# Patient Record
Sex: FEMALE | Race: BLACK OR AFRICAN AMERICAN | NOT HISPANIC OR LATINO | Employment: FULL TIME | URBAN - METROPOLITAN AREA
[De-identification: names, ages, dates, MRNs, and addresses within clinical notes are randomized per-mention and may not be internally consistent; named-entity substitution may affect disease eponyms.]

---

## 2018-05-18 ENCOUNTER — TRANSCRIBE ORDERS (OUTPATIENT)
Dept: ADMINISTRATIVE | Facility: HOSPITAL | Age: 48
End: 2018-05-18

## 2018-05-18 ENCOUNTER — HOSPITAL ENCOUNTER (OUTPATIENT)
Dept: ULTRASOUND IMAGING | Facility: HOSPITAL | Age: 48
Discharge: HOME | End: 2018-05-18
Admitting: INTERNAL MEDICINE

## 2018-05-18 DIAGNOSIS — E01.0 THYROMEGALY: ICD-10-CM

## 2018-05-18 DIAGNOSIS — E01.0 THYROMEGALY: Primary | ICD-10-CM

## 2018-05-18 PROCEDURE — 76536 US EXAM OF HEAD AND NECK: CPT

## 2018-06-14 ENCOUNTER — OFFICE VISIT (OUTPATIENT)
Dept: OBSTETRICS AND GYNECOLOGY | Facility: CLINIC | Age: 48
End: 2018-06-14

## 2018-06-14 VITALS
DIASTOLIC BLOOD PRESSURE: 72 MMHG | WEIGHT: 220.6 LBS | SYSTOLIC BLOOD PRESSURE: 132 MMHG | HEIGHT: 67 IN | BODY MASS INDEX: 34.62 KG/M2

## 2018-06-14 DIAGNOSIS — Z12.31 VISIT FOR SCREENING MAMMOGRAM: ICD-10-CM

## 2018-06-14 DIAGNOSIS — Z01.419 WELL WOMAN EXAM WITH ROUTINE GYNECOLOGICAL EXAM: Primary | ICD-10-CM

## 2018-06-14 PROCEDURE — 99386 PREV VISIT NEW AGE 40-64: CPT | Performed by: OBSTETRICS & GYNECOLOGY

## 2018-06-14 NOTE — PROGRESS NOTES
Subjective   Franchesca Elizondo is a 48 y.o. female is here today as a self referral for annual.    History of Present Illness-here today for annual exam and checkup.  Hysterectomy was performed for abnormal bleeding.  No problems noted.    The following portions of the patient's history were reviewed and updated as appropriate: allergies, current medications, past family history, past medical history, past social history, past surgical history and problem list.    Review of Systems   Constitutional: Negative.    HENT: Negative.    Eyes: Negative.    Respiratory: Negative.    Cardiovascular: Negative.    Gastrointestinal: Negative.    Endocrine: Negative.    Genitourinary: Negative.    Musculoskeletal: Negative.    Skin: Negative.    Allergic/Immunologic: Negative.    Neurological: Negative.    Hematological: Negative.    Psychiatric/Behavioral: Negative.        Objective   Physical Exam   Constitutional: She is oriented to person, place, and time. She appears well-developed and well-nourished.   HENT:   Head: Normocephalic and atraumatic.   Nose: Nose normal.   Eyes: Conjunctivae and EOM are normal. Pupils are equal, round, and reactive to light.   Neck: Normal range of motion. Neck supple.   Cardiovascular: Normal rate, regular rhythm and normal heart sounds.    Pulmonary/Chest: Effort normal and breath sounds normal. Right breast exhibits no inverted nipple, no mass, no nipple discharge, no skin change and no tenderness. Left breast exhibits no inverted nipple, no mass, no nipple discharge and no skin change. Breasts are symmetrical. There is no breast swelling.   Abdominal: Soft. Hernia confirmed negative in the right inguinal area and confirmed negative in the left inguinal area.   Genitourinary: Rectum normal. No breast tenderness, discharge or bleeding. Pelvic exam was performed with patient supine. No labial fusion. There is no rash, tenderness, lesion or injury on the right labia. There is no rash,  tenderness, lesion or injury on the left labia. Right adnexum displays no mass, no tenderness and no fullness. Left adnexum displays no mass, no tenderness and no fullness. No erythema or bleeding in the vagina. No foreign body in the vagina. No signs of injury around the vagina. No vaginal discharge found.   Neurological: She is alert and oriented to person, place, and time. She has normal reflexes.   Skin: Skin is warm and dry.   Psychiatric: She has a normal mood and affect. Her behavior is normal. Judgment and thought content normal.         Assessment/Plan   Problems Addressed this Visit     None      Visit Diagnoses     Well woman exam with routine gynecological exam    -  Primary        Mammogram encouraged.

## 2018-06-18 ENCOUNTER — TRANSCRIBE ORDERS (OUTPATIENT)
Dept: ADMINISTRATIVE | Facility: HOSPITAL | Age: 48
End: 2018-06-18

## 2018-06-18 DIAGNOSIS — Z12.31 VISIT FOR SCREENING MAMMOGRAM: Primary | ICD-10-CM

## 2018-06-29 ENCOUNTER — HOSPITAL ENCOUNTER (OUTPATIENT)
Dept: MAMMOGRAPHY | Facility: HOSPITAL | Age: 48
Discharge: HOME | End: 2018-06-29
Attending: OBSTETRICS & GYNECOLOGY | Admitting: OBSTETRICS & GYNECOLOGY

## 2018-06-29 DIAGNOSIS — Z12.31 VISIT FOR SCREENING MAMMOGRAM: ICD-10-CM

## 2018-06-29 PROCEDURE — 77063 BREAST TOMOSYNTHESIS BI: CPT

## 2018-06-29 PROCEDURE — 77067 SCR MAMMO BI INCL CAD: CPT

## 2018-07-05 DIAGNOSIS — N64.89 BREAST ASYMMETRY: Primary | ICD-10-CM

## 2018-07-20 ENCOUNTER — HOSPITAL ENCOUNTER (OUTPATIENT)
Dept: MAMMOGRAPHY | Facility: HOSPITAL | Age: 48
Discharge: HOME | End: 2018-07-20
Attending: OBSTETRICS & GYNECOLOGY | Admitting: OBSTETRICS & GYNECOLOGY

## 2018-07-20 ENCOUNTER — HOSPITAL ENCOUNTER (OUTPATIENT)
Dept: ULTRASOUND IMAGING | Facility: HOSPITAL | Age: 48
Discharge: HOME | End: 2018-07-20
Attending: OBSTETRICS & GYNECOLOGY

## 2018-07-20 DIAGNOSIS — N64.89 BREAST ASYMMETRY: ICD-10-CM

## 2018-07-20 PROCEDURE — 76642 ULTRASOUND BREAST LIMITED: CPT

## 2018-07-20 PROCEDURE — 77065 DX MAMMO INCL CAD UNI: CPT

## 2020-07-14 ENCOUNTER — OFFICE VISIT (OUTPATIENT)
Dept: FAMILY MEDICINE CLINIC | Facility: CLINIC | Age: 50
End: 2020-07-14

## 2020-07-14 VITALS
HEART RATE: 83 BPM | RESPIRATION RATE: 16 BRPM | BODY MASS INDEX: 36.22 KG/M2 | DIASTOLIC BLOOD PRESSURE: 84 MMHG | WEIGHT: 230.8 LBS | SYSTOLIC BLOOD PRESSURE: 122 MMHG | OXYGEN SATURATION: 99 % | HEIGHT: 67 IN | TEMPERATURE: 97.8 F

## 2020-07-14 DIAGNOSIS — E78.00 PURE HYPERCHOLESTEROLEMIA: Primary | ICD-10-CM

## 2020-07-14 PROCEDURE — 99213 OFFICE O/P EST LOW 20 MIN: CPT | Performed by: INTERNAL MEDICINE

## 2020-07-14 RX ORDER — EPINEPHRINE 0.3 MG/.3ML
0.3 INJECTION SUBCUTANEOUS
COMMUNITY
Start: 2015-08-14

## 2020-07-14 RX ORDER — MELATONIN
1000 DAILY
COMMUNITY

## 2020-07-15 DIAGNOSIS — E78.00 PURE HYPERCHOLESTEROLEMIA: ICD-10-CM

## 2020-07-16 ENCOUNTER — TELEPHONE (OUTPATIENT)
Dept: FAMILY MEDICINE CLINIC | Facility: CLINIC | Age: 50
End: 2020-07-16

## 2020-07-16 LAB
CHOLEST SERPL-MCNC: 224 MG/DL (ref 0–200)
HCV AB S/CO SERPL IA: <0.1 S/CO RATIO (ref 0–0.9)
HDLC SERPL-MCNC: 60 MG/DL (ref 40–60)
LDLC SERPL CALC-MCNC: 147 MG/DL (ref 0–100)
TRIGL SERPL-MCNC: 86 MG/DL (ref 0–150)
VLDLC SERPL CALC-MCNC: 17.2 MG/DL

## 2020-07-16 NOTE — TELEPHONE ENCOUNTER
Patient's LDL is elevated at 147.  Determine with her next visits currently scheduled for 8/4/2020 that the patient was truly fasting    Patient is currently on simvastatin 5 mg half tab by mouth every at bedtime terminal Y such a low dose.

## 2020-07-30 ENCOUNTER — TELEPHONE (OUTPATIENT)
Dept: FAMILY MEDICINE CLINIC | Facility: CLINIC | Age: 50
End: 2020-07-30

## 2020-08-11 ENCOUNTER — OFFICE VISIT (OUTPATIENT)
Dept: FAMILY MEDICINE CLINIC | Facility: CLINIC | Age: 50
End: 2020-08-11

## 2020-08-11 VITALS
SYSTOLIC BLOOD PRESSURE: 130 MMHG | HEART RATE: 91 BPM | TEMPERATURE: 99.1 F | BODY MASS INDEX: 36.73 KG/M2 | RESPIRATION RATE: 16 BRPM | DIASTOLIC BLOOD PRESSURE: 98 MMHG | WEIGHT: 234 LBS | HEIGHT: 67 IN | OXYGEN SATURATION: 99 %

## 2020-08-11 DIAGNOSIS — E01.0 THYROMEGALY: ICD-10-CM

## 2020-08-11 DIAGNOSIS — Z00.00 ENCOUNTER FOR PHYSICAL EXAMINATION: Primary | ICD-10-CM

## 2020-08-11 DIAGNOSIS — E78.41 ELEVATED LIPOPROTEIN(A): ICD-10-CM

## 2020-08-11 PROCEDURE — 99396 PREV VISIT EST AGE 40-64: CPT | Performed by: INTERNAL MEDICINE

## 2020-08-13 NOTE — PROGRESS NOTES
2020    CC: Annual Exam  .        HPI  Obesity   This is a recurrent problem. The current episode started more than 1 month ago. The problem occurs 2 to 4 times per day. The problem has been gradually worsening. The symptoms are aggravated by walking, swallowing, stress and standing. She has tried heat and ice for the symptoms. The treatment provided mild relief.        Marquis Elizondo is a 50 y.o. female.      The following portions of the patient's history were reviewed and updated as appropriate: allergies, current medications, past family history, past medical history, past social history, past surgical history and problem list.    Problem List  There is no problem list on file for this patient.      Past Medical History  Past Medical History:   Diagnosis Date   • Abnormal weight gain 2019   • Dizziness and giddiness    • Hyperlipidemia 2018   • Hypertension 2018   • Menorrhagia    • Vitamin D deficiency disease 2014       Surgical History  Past Surgical History:   Procedure Laterality Date   • BLADDER REPAIR     •  SECTION     • HYSTERECTOMY     • TUBAL ABDOMINAL LIGATION         Family History  History reviewed. No pertinent family history.    Social History  Social History    Tobacco Use      Smoking status: Never Smoker      Smokeless tobacco: Never Used       Is the Patient a current tobacco user? No    Allergies  Allergies   Allergen Reactions   • Sulfa Antibiotics Itching     Sulfa based eye medicine irritated eyes severely.       Current Medications    Current Outpatient Medications:   •  cholecalciferol (VITAMIN D3) 25 MCG (1000 UT) tablet, Take 1,000 Units by mouth Daily., Disp: , Rfl:   •  EPINEPHrine (EPIPEN) 0.3 MG/0.3ML solution auto-injector injection, Inject 0.3 mg into the appropriate muscle as directed by prescriber., Disp: , Rfl:      Review of System  Review of Systems   Constitutional: Negative.    HENT: Negative.    Eyes:  Negative.    Respiratory: Negative.    Cardiovascular: Negative.    Gastrointestinal: Negative.    Musculoskeletal: Negative.    Skin: Negative.    Psychiatric/Behavioral: Negative.      I have reviewed and confirmed the accuracy of the ROS as documented by the MA/LPN/RN Rivera Churchill MD    Vitals:    08/11/20 1613   BP: 130/98   Pulse: 91   Resp: 16   Temp: 99.1 °F (37.3 °C)   SpO2: 99%     Body mass index is 36.65 kg/m².    Objective     Orders Only on 07/15/2020   Component Date Value Ref Range Status   • Total Cholesterol 07/15/2020 224* 0 - 200 mg/dL Final   • Triglycerides 07/15/2020 86  0 - 150 mg/dL Final   • HDL Cholesterol 07/15/2020 60  40 - 60 mg/dL Final   • VLDL Cholesterol 07/15/2020 17.2  mg/dL Final   • LDL Cholesterol  07/15/2020 147* 0 - 100 mg/dL Final   • Hep C Virus Ab 07/15/2020 <0.1  0.0 - 0.9 s/co ratio Final    Comment:                                   Negative:     < 0.8                               Indeterminate: 0.8 - 0.9                                    Positive:     > 0.9   The CDC recommends that a positive HCV antibody result   be followed up with a HCV Nucleic Acid Amplification   test ([id number]).         Physical Exam  Physical Exam   Constitutional: She is oriented to person, place, and time. She appears well-developed and well-nourished.   Eyes: Conjunctivae and EOM are normal.   Neck: Normal range of motion. Neck supple. Thyromegaly present.   Cardiovascular: Normal rate, regular rhythm and normal heart sounds.   Pulmonary/Chest: Effort normal and breath sounds normal.   Abdominal: Soft. Bowel sounds are normal.   Musculoskeletal: Normal range of motion.   Neurological: She is alert and oriented to person, place, and time.   Skin: Skin is warm and dry.   Psychiatric: She has a normal mood and affect. Her behavior is normal.   Nursing note and vitals reviewed.      Assessment/Plan   Franchesca was seen today for annual exam.    Diagnoses and all orders for this  visit:    Encounter for physical examination    Thyromegaly  -     US thyroid; Future  -     T4, free; Future  -     TSH; Future    Elevated lipoprotein(a)             Rivera Churchill MD  08/11/2020

## 2020-08-15 LAB
T4 FREE SERPL-MCNC: 1.22 NG/DL (ref 0.93–1.7)
TSH SERPL DL<=0.005 MIU/L-ACNC: 2.15 UIU/ML (ref 0.27–4.2)

## 2020-08-16 ENCOUNTER — RESULTS ENCOUNTER (OUTPATIENT)
Dept: FAMILY MEDICINE CLINIC | Facility: CLINIC | Age: 50
End: 2020-08-16

## 2020-08-16 DIAGNOSIS — E01.0 THYROMEGALY: ICD-10-CM

## 2020-08-17 ENCOUNTER — HOSPITAL ENCOUNTER (OUTPATIENT)
Dept: ULTRASOUND IMAGING | Facility: HOSPITAL | Age: 50
Discharge: HOME | End: 2020-08-17
Admitting: INTERNAL MEDICINE

## 2020-08-17 DIAGNOSIS — E01.0 THYROMEGALY: ICD-10-CM

## 2020-08-17 PROCEDURE — 76536 US EXAM OF HEAD AND NECK: CPT

## 2020-10-05 DIAGNOSIS — E78.41 ELEVATED LIPOPROTEIN(A): Primary | ICD-10-CM

## 2020-10-06 LAB
CHOLEST SERPL-MCNC: 234 MG/DL (ref 0–200)
CHOLEST/HDLC SERPL: 3.66 {RATIO}
HDLC SERPL-MCNC: 64 MG/DL (ref 40–60)
LDLC SERPL CALC-MCNC: 148 MG/DL (ref 0–100)
TRIGL SERPL-MCNC: 109 MG/DL (ref 0–150)
VLDLC SERPL CALC-MCNC: 21.8 MG/DL

## 2020-10-09 ENCOUNTER — TELEPHONE (OUTPATIENT)
Dept: FAMILY MEDICINE CLINIC | Facility: CLINIC | Age: 50
End: 2020-10-09

## 2020-10-09 ENCOUNTER — OFFICE VISIT (OUTPATIENT)
Dept: FAMILY MEDICINE CLINIC | Facility: CLINIC | Age: 50
End: 2020-10-09

## 2020-10-09 VITALS
HEIGHT: 67 IN | HEART RATE: 88 BPM | WEIGHT: 230.6 LBS | DIASTOLIC BLOOD PRESSURE: 88 MMHG | RESPIRATION RATE: 16 BRPM | OXYGEN SATURATION: 99 % | SYSTOLIC BLOOD PRESSURE: 130 MMHG | BODY MASS INDEX: 36.19 KG/M2 | TEMPERATURE: 96.6 F

## 2020-10-09 DIAGNOSIS — E78.49 OTHER HYPERLIPIDEMIA: ICD-10-CM

## 2020-10-09 DIAGNOSIS — I10 ESSENTIAL HYPERTENSION: Primary | ICD-10-CM

## 2020-10-09 PROCEDURE — 99214 OFFICE O/P EST MOD 30 MIN: CPT | Performed by: INTERNAL MEDICINE

## 2020-10-09 RX ORDER — ROSUVASTATIN CALCIUM 10 MG/1
TABLET, COATED ORAL
Qty: 15 TABLET | Refills: 0 | Status: SHIPPED | OUTPATIENT
Start: 2020-10-09 | End: 2020-10-28 | Stop reason: SDUPTHER

## 2020-10-09 NOTE — TELEPHONE ENCOUNTER
PATIENT WAS SEEN TODAY BY DR. CASTORENA      PATIENT WAS TOLD BY DR. YING THAT SHE CAN NOT TAKE THE GENERIC OF THE CHOLESTEROL MEDICATION PRESCRIBE TO HER TODAY    PATIENT IS NEEDING DR. CASTORENA TO CALL THE INSURANCE COMPANY AND EXPLAIN WHY SHE NEEDS TO TAKE THE BRAND NAME MEDICATION INSTEAD OF THE GENETIC        PT [phone]

## 2020-10-28 RX ORDER — ROSUVASTATIN CALCIUM 10 MG/1
TABLET, COATED ORAL
Qty: 15 TABLET | Refills: 4 | Status: SHIPPED | OUTPATIENT
Start: 2020-10-28 | End: 2020-12-09

## 2020-10-28 NOTE — PROGRESS NOTES
10/09/2020    CC: Follow-up (on labs and thyroid scan)  .        HPI  Hyperlipidemia  This is a chronic problem. The current episode started more than 1 year ago. The problem is uncontrolled. Recent lipid tests were reviewed and are high. There are no known factors aggravating her hyperlipidemia. The current treatment provides no improvement of lipids. Compliance problems include adherence to diet.  Risk factors for coronary artery disease include hypertension and dyslipidemia.        Marquis Elizondo is a 50 y.o. female.      The following portions of the patient's history were reviewed and updated as appropriate: allergies, current medications, past family history, past medical history, past social history, past surgical history and problem list.    Problem List  There is no problem list on file for this patient.      Past Medical History  Past Medical History:   Diagnosis Date   • Abnormal weight gain 2019   • Dizziness and giddiness    • Hyperlipidemia 2018   • Hypertension 2018   • Menorrhagia    • Vitamin D deficiency disease 2014       Surgical History  Past Surgical History:   Procedure Laterality Date   • BLADDER REPAIR     •  SECTION     • HYSTERECTOMY     • TUBAL ABDOMINAL LIGATION         Family History  History reviewed. No pertinent family history.    Social History  Social History    Tobacco Use      Smoking status: Never Smoker      Smokeless tobacco: Never Used       Is the Patient a current tobacco user? No    Allergies  Allergies   Allergen Reactions   • Sulfa Antibiotics Itching     Sulfa based eye medicine irritated eyes severely.       Current Medications    Current Outpatient Medications:   •  cholecalciferol (VITAMIN D3) 25 MCG (1000 UT) tablet, Take 1,000 Units by mouth Daily., Disp: , Rfl:   •  EPINEPHrine (EPIPEN) 0.3 MG/0.3ML solution auto-injector injection, Inject 0.3 mg into the appropriate muscle as directed by prescriber., Disp: ,  Rfl:   •  rosuvastatin (Crestor) 10 MG tablet, 1 q every other day, Disp: 15 tablet, Rfl: 4     Review of System  Review of Systems   Constitutional: Negative.    Eyes: Negative.    Gastrointestinal: Negative.    Neurological: Negative.    Hematological: Negative.      I have reviewed and confirmed the accuracy of the ROS as documented by the MA/LPN/RN Rivera Churchill MD    Vitals:    10/09/20 0833   BP: 130/88   Pulse: 88   Resp: 16   Temp: 96.6 °F (35.9 °C)   SpO2: 99%     Body mass index is 36.12 kg/m².    Objective     Orders Only on 10/05/2020   Component Date Value Ref Range Status   • Total Cholesterol 10/05/2020 234* 0 - 200 mg/dL Final   • Triglycerides 10/05/2020 109  0 - 150 mg/dL Final   • HDL Cholesterol 10/05/2020 64* 40 - 60 mg/dL Final   • VLDL Cholesterol Jered 10/05/2020 21.8  mg/dL Final   • LDL Chol Calc ([facility]) 10/05/2020 148* 0 - 100 mg/dL Final   • Chol/HDL Ratio 10/05/2020 3.66   Final       Physical Exam  Physical Exam  Constitutional:       Appearance: Normal appearance.   HENT:      Head: Normocephalic.   Cardiovascular:      Pulses: Normal pulses.      Heart sounds: Normal heart sounds.   Pulmonary:      Effort: Pulmonary effort is normal.      Breath sounds: Normal breath sounds.   Abdominal:      General: Abdomen is flat.      Palpations: Abdomen is soft.   Neurological:      Mental Status: She is alert.         Assessment/Plan    This patient presents today for follow-up of hyperlipidemia.  Is a long history of elevated cholesterol last LDL was 148 a previous level was 147.  She was previously on simvastatin [place] is called myalgias.  We switched her to Crestor 10 mg every OD with plans to increase it up to daily after about a month if no improvement in her cholesterol.  Patient also had a prior history of thyroid nodule and thyroid scan are shared with her done on 8/17 showed no thyroid nodules abnormalities.  Her blood pressure was within normal limits 130/80 in the left arm sitting  position standard cuff.    We also discussed the importance of weight loss this discussion was for 15 minutes.  Patient would like to avoid increases in her Crestor but as we indicated to her we have to get these LDL levels down.  We suggested Weight Watchers as a possible helper  for her in losing weight. Her current weight is unchanged from the past 4 months.            Diagnoses and all orders for this visit:    1. Essential hypertension (Primary): Established problem, controlled    2. Other hyperlipidemia: Established problem, poorly controlled    Other orders  -     Discontinue: rosuvastatin (Crestor) 10 MG tablet; 1 q every day  Dispense: 15 tablet; Refill: 0    Plan:  Continue Crestor 10 mg 1 tab by mouth every other day, considering increasing to daily depending on future lipid levels.         Rivera Churchill MD  10/09/2020   English

## 2020-11-17 ENCOUNTER — TELEPHONE (OUTPATIENT)
Dept: FAMILY MEDICINE CLINIC | Facility: CLINIC | Age: 50
End: 2020-11-17

## 2020-11-17 DIAGNOSIS — E78.00 PURE HYPERCHOLESTEROLEMIA: Primary | ICD-10-CM

## 2020-11-22 ENCOUNTER — RESULTS ENCOUNTER (OUTPATIENT)
Dept: FAMILY MEDICINE CLINIC | Facility: CLINIC | Age: 50
End: 2020-11-22

## 2020-11-22 DIAGNOSIS — E78.00 PURE HYPERCHOLESTEROLEMIA: ICD-10-CM

## 2020-12-01 LAB
CHOLEST SERPL-MCNC: 210 MG/DL (ref 0–200)
CHOLEST/HDLC SERPL: 3.18 {RATIO}
HDLC SERPL-MCNC: 66 MG/DL (ref 40–60)
LDLC SERPL CALC-MCNC: 127 MG/DL (ref 0–100)
TRIGL SERPL-MCNC: 98 MG/DL (ref 0–150)
VLDLC SERPL CALC-MCNC: 17 MG/DL (ref 5–40)

## 2020-12-09 ENCOUNTER — OFFICE VISIT (OUTPATIENT)
Dept: FAMILY MEDICINE CLINIC | Facility: CLINIC | Age: 50
End: 2020-12-09

## 2020-12-09 VITALS
HEIGHT: 67 IN | TEMPERATURE: 97.5 F | SYSTOLIC BLOOD PRESSURE: 128 MMHG | DIASTOLIC BLOOD PRESSURE: 86 MMHG | WEIGHT: 213.4 LBS | HEART RATE: 84 BPM | BODY MASS INDEX: 33.49 KG/M2 | OXYGEN SATURATION: 97 %

## 2020-12-09 DIAGNOSIS — E78.49 OTHER HYPERLIPIDEMIA: Primary | ICD-10-CM

## 2020-12-09 DIAGNOSIS — R63.4 RECENT WEIGHT LOSS: ICD-10-CM

## 2020-12-09 PROCEDURE — 99213 OFFICE O/P EST LOW 20 MIN: CPT | Performed by: INTERNAL MEDICINE

## 2020-12-09 NOTE — PROGRESS NOTES
2020    CC: Discuss lab results  .        HPI  Hyperlipidemia  This is a chronic problem. The current episode started more than 1 month ago. The problem is uncontrolled. Recent lipid tests were reviewed and are high. Exacerbating diseases include obesity. Factors aggravating her hyperlipidemia include fatty foods. She is currently on no antihyperlipidemic treatment.        Subjective   Franchesca Elizondo is a 50 y.o. female.      The following portions of the patient's history were reviewed and updated as appropriate: allergies, current medications, past family history, past medical history, past social history, past surgical history and problem list.    Problem List  There is no problem list on file for this patient.      Past Medical History  Past Medical History:   Diagnosis Date   • Abnormal weight gain 2019   • Dizziness and giddiness    • Hyperlipidemia 2018   • Hypertension 2018   • Menorrhagia    • Vitamin D deficiency disease 2014       Surgical History  Past Surgical History:   Procedure Laterality Date   • BLADDER REPAIR     •  SECTION     • HYSTERECTOMY     • TUBAL ABDOMINAL LIGATION         Family History  History reviewed. No pertinent family history.    Social History  Social History    Tobacco Use      Smoking status: Never Smoker      Smokeless tobacco: Never Used       Is the Patient a current tobacco user? No    Allergies  Allergies   Allergen Reactions   • Sulfa Antibiotics Itching     Sulfa based eye medicine irritated eyes severely.       Current Medications    Current Outpatient Medications:   •  cholecalciferol (VITAMIN D3) 25 MCG (1000 UT) tablet, Take 1,000 Units by mouth Daily., Disp: , Rfl:   •  EPINEPHrine (EPIPEN) 0.3 MG/0.3ML solution auto-injector injection, Inject 0.3 mg into the appropriate muscle as directed by prescriber., Disp: , Rfl:   •  rosuvastatin (Crestor) 10 MG tablet, 1 q every other day, Disp: 15 tablet, Rfl: 4     Review of  System  Review of Systems   Constitutional: Negative.    HENT: Negative.    Eyes: Negative.    Respiratory: Negative.      I have reviewed and confirmed the accuracy of the ROS as documented by the MA/LPN/RN Rivera Churchill MD    Vitals:    12/09/20 0821   BP: 128/86   Pulse: 84   Temp: 97.5 °F (36.4 °C)   SpO2: 97%     Body mass index is 33.42 kg/m².    Objective     Orders Only on 11/30/2020   Component Date Value Ref Range Status   • Total Cholesterol 11/30/2020 210* 0 - 200 mg/dL Final   • Triglycerides 11/30/2020 98  0 - 150 mg/dL Final   • HDL Cholesterol 11/30/2020 66* 40 - 60 mg/dL Final   • VLDL Cholesterol Jered 11/30/2020 17  5 - 40 mg/dL Final   • LDL Chol Calc ([facility]) 11/30/2020 127* 0 - 100 mg/dL Final   • Chol/HDL Ratio 11/30/2020 3.18   Final       Physical Exam  Physical Exam  Constitutional:       Appearance: Normal appearance. She is obese.   HENT:      Head: Atraumatic.      Nose: Nose normal.   Eyes:      Extraocular Movements: Extraocular movements intact.   Neck:      Musculoskeletal: Normal range of motion.   Cardiovascular:      Rate and Rhythm: Normal rate and regular rhythm.   Pulmonary:      Effort: Pulmonary effort is normal.   Neurological:      Mental Status: She is alert.         Assessment/Plan        This very pleasant patient presents today for follow-up of hyperlipidemia.  She relates she is watching her diet very carefully.  With her last visit we knew that her LDL was elevated at 147 we recommended she get started on a statin however she previously had trouble with her insurance company paying for Crestor and she had had problems with other statins.  The patient elected to try to reduce her LDL with diet alone.    Her blood pressure was well controlled today at 128/86 in the left arm sitting position.    We note that she has lost 17 pounds from her last visit!.  He has done this by just cutting back on portions and watching her meat intake.  She is also purchased a Fitbit and is  getting in 10,000 steps per day at least 80% of the time.    This is an extremely difficult time of the year to diet and I think that she can make more progress if we recheck her again over in the spring.  She agrees she has several birthdays and holidays to come.  We will see her back in follow-up and reevaluate her cholesterol at that time she will remain off statins and continue with her fat reducing diet.    Patient will return in 1 week before next scheduled appointment for lipid profile.    Diagnoses and all orders for this visit:    1. Other hyperlipidemia (Primary): Established problem, improved  -     Lipid Panel With LDL/HDL Ratio; Future    2. Recent weight loss: New problem, patient doing well.           Rivera Churchill MD  12/09/2020

## 2020-12-14 ENCOUNTER — RESULTS ENCOUNTER (OUTPATIENT)
Dept: FAMILY MEDICINE CLINIC | Facility: CLINIC | Age: 50
End: 2020-12-14

## 2020-12-14 DIAGNOSIS — E78.49 OTHER HYPERLIPIDEMIA: ICD-10-CM

## 2021-01-11 ENCOUNTER — TRANSCRIBE ORDERS (OUTPATIENT)
Dept: ADMINISTRATIVE | Facility: HOSPITAL | Age: 51
End: 2021-01-11

## 2021-01-11 DIAGNOSIS — Z12.31 SCREENING MAMMOGRAM, ENCOUNTER FOR: Primary | ICD-10-CM

## 2021-02-25 ENCOUNTER — HOSPITAL ENCOUNTER (OUTPATIENT)
Dept: MAMMOGRAPHY | Facility: HOSPITAL | Age: 51
Discharge: HOME | End: 2021-02-25
Admitting: INTERNAL MEDICINE

## 2021-02-25 DIAGNOSIS — Z12.31 SCREENING MAMMOGRAM, ENCOUNTER FOR: ICD-10-CM

## 2021-02-25 PROCEDURE — 77063 BREAST TOMOSYNTHESIS BI: CPT

## 2021-02-25 PROCEDURE — 77067 SCR MAMMO BI INCL CAD: CPT

## 2021-03-01 ENCOUNTER — TELEPHONE (OUTPATIENT)
Dept: FAMILY MEDICINE CLINIC | Facility: CLINIC | Age: 51
End: 2021-03-01

## 2021-03-01 NOTE — TELEPHONE ENCOUNTER
Caller: Franchesca Moeller    Relationship to patient: Self    Best call back number: [phone]*    Patient is needing: PATIENT CALLED NEEDING TO SCHEDULE FOR LABS. ATTEMPTED TO WT, NO ANSWER.

## 2021-03-03 LAB
CHOLEST SERPL-MCNC: 195 MG/DL (ref 0–200)
HDLC SERPL-MCNC: 63 MG/DL (ref 40–60)
LDLC SERPL CALC-MCNC: 116 MG/DL (ref 0–100)
LDLC/HDLC SERPL: 1.82 {RATIO}
TRIGL SERPL-MCNC: 87 MG/DL (ref 0–150)
VLDLC SERPL CALC-MCNC: 16 MG/DL (ref 5–40)

## 2021-03-09 ENCOUNTER — OFFICE VISIT (OUTPATIENT)
Dept: FAMILY MEDICINE CLINIC | Facility: CLINIC | Age: 51
End: 2021-03-09

## 2021-03-09 VITALS
HEIGHT: 67 IN | BODY MASS INDEX: 32.24 KG/M2 | RESPIRATION RATE: 16 BRPM | TEMPERATURE: 98 F | SYSTOLIC BLOOD PRESSURE: 130 MMHG | WEIGHT: 205.4 LBS | DIASTOLIC BLOOD PRESSURE: 88 MMHG

## 2021-03-09 DIAGNOSIS — I10 ESSENTIAL HYPERTENSION: Chronic | ICD-10-CM

## 2021-03-09 DIAGNOSIS — E78.00 PURE HYPERCHOLESTEROLEMIA: Primary | ICD-10-CM

## 2021-03-09 PROCEDURE — 99214 OFFICE O/P EST MOD 30 MIN: CPT | Performed by: INTERNAL MEDICINE

## 2021-03-09 NOTE — PROGRESS NOTES
2021    CC: Hyperlipidemia (follow up...no other issues)  .        HPI  Hyperlipidemia  This is a chronic problem. The current episode started more than 1 year ago. The problem is uncontrolled. Recent lipid tests were reviewed and are high. Factors aggravating her hyperlipidemia include thiazides. Current antihyperlipidemic treatment includes exercise and diet change. The current treatment provides moderate improvement of lipids. There are no compliance problems.         Subjective   Franchesca Elizondo is a 51 y.o. female.      The following portions of the patient's history were reviewed and updated as appropriate: allergies, current medications, past family history, past medical history, past social history, past surgical history and problem list.    Problem List  There is no problem list on file for this patient.      Past Medical History  Past Medical History:   Diagnosis Date   • Abnormal weight gain 2019   • Dizziness and giddiness    • Hyperlipidemia 2018   • Hypertension 2018   • Menorrhagia    • Vitamin D deficiency disease 2014       Surgical History  Past Surgical History:   Procedure Laterality Date   • BLADDER REPAIR     •  SECTION     • HYSTERECTOMY     • TUBAL ABDOMINAL LIGATION         Family History  History reviewed. No pertinent family history.    Social History  Social History    Tobacco Use      Smoking status: Never Smoker      Smokeless tobacco: Never Used       Is the Patient a current tobacco user? No    Allergies  Allergies   Allergen Reactions   • Sulfa Antibiotics Itching     Sulfa based eye medicine irritated eyes severely.       Current Medications    Current Outpatient Medications:   •  cholecalciferol (VITAMIN D3) 25 MCG (1000 UT) tablet, Take 1,000 Units by mouth Daily., Disp: , Rfl:   •  EPINEPHrine (EPIPEN) 0.3 MG/0.3ML solution auto-injector injection, Inject 0.3 mg into the appropriate muscle as directed by prescriber., Disp: , Rfl:       Review of System  Review of Systems   HENT: Negative.    Eyes: Negative.    Respiratory: Negative.    Cardiovascular: Negative.    Gastrointestinal: Negative.      I have reviewed and confirmed the accuracy of the ROS as documented by the MA/LPN/RN Rivera Churchill MD    Vitals:    03/09/21 0827   BP: 130/88   Resp: 16   Temp: 98 °F (36.7 °C)     Body mass index is 32.17 kg/m².    Objective     Physical Exam  Physical Exam  HENT:      Head: Normocephalic and atraumatic.   Pulmonary:      Effort: Pulmonary effort is normal.   Musculoskeletal:      Cervical back: Normal range of motion.         Assessment/Plan      This patient presents for follow-up of hyperlipidemia.  She has worked aggressively on her diet and exercise to get her numbers down.  Her LDL has decreased over the past 6 months from 147 to its current 116.  She was on statin for a while but had much muscle aches and discomfort.    I feel she should be given more time and with the movement from winter to spring and summer I think that she will exercise more and burn more calories.  In any case we will reevaluate in about 3 months.        Diagnoses and all orders for this visit:    1. Pure hypercholesterolemia (Primary)  -     Lipid panel; Future    2. Essential hypertension  Comments:  Stable             Rivera Churchill MD  03/09/2021

## 2021-04-06 ENCOUNTER — BULK ORDERING (OUTPATIENT)
Dept: CASE MANAGEMENT | Facility: OTHER | Age: 51
End: 2021-04-06

## 2021-04-06 DIAGNOSIS — Z23 IMMUNIZATION DUE: ICD-10-CM

## 2021-06-09 ENCOUNTER — OFFICE VISIT (OUTPATIENT)
Dept: FAMILY MEDICINE CLINIC | Facility: CLINIC | Age: 51
End: 2021-06-09

## 2021-06-09 VITALS
SYSTOLIC BLOOD PRESSURE: 130 MMHG | WEIGHT: 201 LBS | HEIGHT: 67 IN | RESPIRATION RATE: 16 BRPM | BODY MASS INDEX: 31.55 KG/M2 | DIASTOLIC BLOOD PRESSURE: 88 MMHG

## 2021-06-09 DIAGNOSIS — E78.49 OTHER HYPERLIPIDEMIA: Primary | ICD-10-CM

## 2021-06-09 DIAGNOSIS — F41.9 ANXIETY: ICD-10-CM

## 2021-06-09 PROCEDURE — 99214 OFFICE O/P EST MOD 30 MIN: CPT | Performed by: INTERNAL MEDICINE

## 2021-06-09 RX ORDER — LORAZEPAM 0.5 MG/1
0.5 TABLET ORAL EVERY 8 HOURS PRN
Qty: 7 TABLET | Refills: 0 | Status: SHIPPED | OUTPATIENT
Start: 2021-06-09

## 2021-06-09 NOTE — PROGRESS NOTES
2021    CC: Hyperlipidemia (follow up...no other issues)  .        HPI  Hyperlipidemia  This is a chronic problem. The current episode started more than 1 month ago. The problem is uncontrolled. Recent lipid tests were reviewed and are high. Factors aggravating her hyperlipidemia include fatty foods. Risk factors for coronary artery disease include hypertension and dyslipidemia.        Subjective   Franchesca Elizondo is a 51 y.o. female.      The following portions of the patient's history were reviewed and updated as appropriate: allergies, current medications, past family history, past medical history, past social history, past surgical history and problem list.    Problem List  There is no problem list on file for this patient.      Past Medical History  Past Medical History:   Diagnosis Date   • Abnormal weight gain 2019   • Dizziness and giddiness    • Hyperlipidemia 2018   • Hypertension 2018   • Menorrhagia    • Vitamin D deficiency disease 2014       Surgical History  Past Surgical History:   Procedure Laterality Date   • BLADDER REPAIR     •  SECTION     • HYSTERECTOMY     • TUBAL ABDOMINAL LIGATION         Family History  History reviewed. No pertinent family history.    Social History  Social History    Tobacco Use      Smoking status: Never Smoker      Smokeless tobacco: Never Used       Is the Patient a current tobacco user? No    Allergies  Allergies   Allergen Reactions   • Sulfa Antibiotics Itching     Sulfa based eye medicine irritated eyes severely.       Current Medications    Current Outpatient Medications:   •  cholecalciferol (VITAMIN D3) 25 MCG (1000 UT) tablet, Take 1,000 Units by mouth Daily., Disp: , Rfl:   •  EPINEPHrine (EPIPEN) 0.3 MG/0.3ML solution auto-injector injection, Inject 0.3 mg into the appropriate muscle as directed by prescriber., Disp: , Rfl:   •  LORazepam (Ativan) 0.5 MG tablet, Take 1 tablet by mouth Every 8 (Eight) Hours As  Needed for Anxiety., Disp: 7 tablet, Rfl: 0     Review of System  Review of Systems   Constitutional: Negative.    Respiratory: Negative.    Cardiovascular: Negative.    Gastrointestinal: Negative.    Endocrine: Negative.      I have reviewed and confirmed the accuracy of the ROS as documented by the MA/LPN/RN Rivera Churchill MD    Vitals:    06/09/21 0815   BP: 130/88   Resp: 16     Body mass index is 31.48 kg/m².    Objective     Physical Exam  Physical Exam  Cardiovascular:      Rate and Rhythm: Normal rate and regular rhythm.      Pulses: Normal pulses.   Pulmonary:      Breath sounds: Normal breath sounds.   Musculoskeletal:      Cervical back: Normal range of motion.         Assessment/Plan      This 51-year-old presents at this time for follow-up of hyperlipidemia.  For the past several weeks she has tried to follow a low-cholesterol diet because of elevated LDL.  She relates however that she is also been under much stress having had some death and illness in the family which caused her to do a lot of traveling and deviate from her diet.  She also relates that her sleep has been very poor over the past few weeks that she deals with some of these stressful situations.  She relates that she awakens several times during the night and has difficulty getting to sleep.    Her LDL from March 2021 was 116 but it increased to 137 x 6/7.        Diagnoses and all orders for this visit:    1. Other hyperlipidemia (Primary)    2. Anxiety  -     LORazepam (Ativan) 0.5 MG tablet; Take 1 tablet by mouth Every 8 (Eight) Hours As Needed for Anxiety.  Dispense: 7 tablet; Refill: 0      Plan:  1.)  She will reinitiate her low-cholesterol diet.  We will see her again for follow-up and 1 month for evaluation of her anxiety  2.)  Start lorazepam 0.5 mg 1 tab p.o. nightly x1 week.  3.)  Repeat your cholesterol in 2 months.       Rivera Churchill MD  06/09/2021

## 2021-07-12 ENCOUNTER — OFFICE VISIT (OUTPATIENT)
Dept: FAMILY MEDICINE CLINIC | Facility: CLINIC | Age: 51
End: 2021-07-12

## 2021-07-12 VITALS
WEIGHT: 205 LBS | HEIGHT: 67 IN | BODY MASS INDEX: 32.18 KG/M2 | DIASTOLIC BLOOD PRESSURE: 98 MMHG | RESPIRATION RATE: 16 BRPM | SYSTOLIC BLOOD PRESSURE: 140 MMHG

## 2021-07-12 DIAGNOSIS — F41.9 ANXIETY: ICD-10-CM

## 2021-07-12 DIAGNOSIS — F32.9 MAJOR DEPRESSIVE DISORDER WITH CURRENT ACTIVE EPISODE, UNSPECIFIED DEPRESSION EPISODE SEVERITY, UNSPECIFIED WHETHER RECURRENT: Primary | ICD-10-CM

## 2021-07-12 PROCEDURE — 99214 OFFICE O/P EST MOD 30 MIN: CPT | Performed by: INTERNAL MEDICINE

## 2021-07-15 RX ORDER — ESCITALOPRAM OXALATE 10 MG/1
TABLET ORAL
Qty: 30 TABLET | Refills: 3 | Status: SHIPPED | OUTPATIENT
Start: 2021-07-15

## 2021-07-16 NOTE — PROGRESS NOTES
2021    CC: Anxiety (f//u...no other issues)  .        HPI  Anxiety  Presents for follow-up visit. Symptoms include depressed mood and restlessness. Symptoms occur most days. The severity of symptoms is moderate.            Marquis Elizondo is a 51 y.o. female.      The following portions of the patient's history were reviewed and updated as appropriate: allergies, current medications, past family history, past medical history, past social history, past surgical history and problem list.    Problem List  There is no problem list on file for this patient.      Past Medical History  Past Medical History:   Diagnosis Date   • Abnormal weight gain 2019   • Dizziness and giddiness    • Hyperlipidemia 2018   • Hypertension 2018   • Menorrhagia    • Vitamin D deficiency disease 2014       Surgical History  Past Surgical History:   Procedure Laterality Date   • BLADDER REPAIR     •  SECTION     • HYSTERECTOMY     • TUBAL ABDOMINAL LIGATION         Family History  History reviewed. No pertinent family history.    Social History  Social History    Tobacco Use      Smoking status: Never Smoker      Smokeless tobacco: Never Used       Is the Patient a current tobacco user? No    Allergies  Allergies   Allergen Reactions   • Sulfa Antibiotics Itching     Sulfa based eye medicine irritated eyes severely.       Current Medications    Current Outpatient Medications:   •  cholecalciferol (VITAMIN D3) 25 MCG (1000 UT) tablet, Take 1,000 Units by mouth Daily., Disp: , Rfl:   •  EPINEPHrine (EPIPEN) 0.3 MG/0.3ML solution auto-injector injection, Inject 0.3 mg into the appropriate muscle as directed by prescriber., Disp: , Rfl:   •  LORazepam (Ativan) 0.5 MG tablet, Take 1 tablet by mouth Every 8 (Eight) Hours As Needed for Anxiety., Disp: 7 tablet, Rfl: 0  •  escitalopram (Lexapro) 10 MG tablet, 1 tablet by mouth daily, Disp: 30 tablet, Rfl: 3     Review of System  Review of  Systems   Eyes: Negative.    Respiratory: Negative.    Cardiovascular: Negative.    Gastrointestinal: Negative.    Psychiatric/Behavioral: Positive for depressed mood.     I have reviewed and confirmed the accuracy of the ROS as documented by the MA/LPN/RN Rivera Churchill MD    Vitals:    07/12/21 1330   BP: 140/98   Resp: 16     Body mass index is 32.11 kg/m².    Objective     Physical Exam  Physical Exam  Cardiovascular:      Rate and Rhythm: Normal rate and regular rhythm.      Pulses: Normal pulses.   Pulmonary:      Effort: Pulmonary effort is normal.      Breath sounds: Normal breath sounds.   Abdominal:      General: Abdomen is flat.      Palpations: Abdomen is soft.   Musculoskeletal:         General: Normal range of motion.   Neurological:      General: No focal deficit present.   Psychiatric:      Comments: Depressed afect noted         Assessment/Plan      This 44-year-old presents at this time for follow-up.  She was seen by us several months ago and diagnosed with depression however she did not want medication at that time..  She referred him to psychiatry and she relates that there they diagnosed her with anxiety and depression.  However she was still reluctant to take the medication.  We have previously given her lorazepam to help with her nighttime anxiety and sleep she relates that the 0.5 mg dip was not very effective.    After much discussion today she agrees to start the medication for depression she was initially given Zoloft by the psychiatrist but he is leaving that practice and will not be following her.  I'll use instead Lexapro 10 mg 1 tab by mouth daily.  We'll see her back for follow-up in the next few weeks to reevaluate.  She'll also try to find a psychologist.  We'll follow-up within 2-3 weeks.  We'll also increase her lorazepam to 1.0 mg to help with her anxiety in the short-term.    There is no homicidal or suicidal ideations.            Diagnoses and all orders for this  visit:    1. Major depressive disorder with current active episode, unspecified depression episode severity, unspecified whether recurrent (Primary)    2. Anxiety    Other orders  -     escitalopram (Lexapro) 10 MG tablet; 1 tablet by mouth daily  Dispense: 30 tablet; Refill: 3      Plan:  1.)  Lexapro 10 mg 1 tab by mouth every a.m.  #2.)  Lorazepam 1.0 mg by mouth daily at bedtime when necessary  #3.)  Follow-up in 3-4 weeks       Rivera Churchill MD  07/12/2021  Answers for HPI/ROS submitted by the patient on 7/12/2021  Please describe your symptoms.: Follow up  Have you had these symptoms before?: No  How long have you been having these symptoms?: 1-4 days  Please list any medications you are currently taking for this condition.: Na  Please describe any probable cause for these symptoms. : Na  What is the primary reason for your visit?: Other

## 2021-09-13 ENCOUNTER — OFFICE VISIT (OUTPATIENT)
Dept: FAMILY MEDICINE CLINIC | Facility: CLINIC | Age: 51
End: 2021-09-13

## 2021-09-13 VITALS
SYSTOLIC BLOOD PRESSURE: 110 MMHG | RESPIRATION RATE: 16 BRPM | BODY MASS INDEX: 32.46 KG/M2 | HEIGHT: 67 IN | DIASTOLIC BLOOD PRESSURE: 88 MMHG | WEIGHT: 206.8 LBS

## 2021-09-13 DIAGNOSIS — F33.1 MODERATE EPISODE OF RECURRENT MAJOR DEPRESSIVE DISORDER (HCC): Chronic | ICD-10-CM

## 2021-09-13 DIAGNOSIS — E78.00 PURE HYPERCHOLESTEROLEMIA: Primary | Chronic | ICD-10-CM

## 2021-09-13 PROCEDURE — 99213 OFFICE O/P EST LOW 20 MIN: CPT | Performed by: INTERNAL MEDICINE

## 2021-09-13 NOTE — PROGRESS NOTES
2021    CC: Depression (f/u...no other issues)  .        HPI  Depression  Visit Type: follow-up  Frequency of symptoms: occasionally   Nighttime awakenings: none         Subjective   Franchesca Elizondo is a 51 y.o. female.      The following portions of the patient's history were reviewed and updated as appropriate: allergies, current medications, past family history, past medical history, past social history, past surgical history and problem list.    Problem List  There is no problem list on file for this patient.      Past Medical History  Past Medical History:   Diagnosis Date   • Abnormal weight gain 2019   • Dizziness and giddiness    • Hyperlipidemia 2018   • Hypertension 2018   • Menorrhagia    • Vitamin D deficiency disease 2014       Surgical History  Past Surgical History:   Procedure Laterality Date   • BLADDER REPAIR     •  SECTION     • HYSTERECTOMY     • TUBAL ABDOMINAL LIGATION         Family History  History reviewed. No pertinent family history.    Social History  Social History    Tobacco Use      Smoking status: Never Smoker      Smokeless tobacco: Never Used       Is the Patient a current tobacco user? No    Allergies  Allergies   Allergen Reactions   • Sulfa Antibiotics Itching     Sulfa based eye medicine irritated eyes severely.       Current Medications    Current Outpatient Medications:   •  cholecalciferol (VITAMIN D3) 25 MCG (1000 UT) tablet, Take 1,000 Units by mouth Daily., Disp: , Rfl:   •  EPINEPHrine (EPIPEN) 0.3 MG/0.3ML solution auto-injector injection, Inject 0.3 mg into the appropriate muscle as directed by prescriber., Disp: , Rfl:   •  escitalopram (Lexapro) 10 MG tablet, 1 tablet by mouth daily, Disp: 30 tablet, Rfl: 3  •  LORazepam (Ativan) 0.5 MG tablet, Take 1 tablet by mouth Every 8 (Eight) Hours As Needed for Anxiety., Disp: 7 tablet, Rfl: 0     Review of System  Review of Systems   Constitutional: Negative.    Eyes: Negative.     Cardiovascular: Negative.    Gastrointestinal: Negative.      I have reviewed and confirmed the accuracy of the ROS as documented by the MA/LPN/RN Rivera Churchill MD    Vitals:    09/13/21 0840   BP: 110/88   Resp: 16     Body mass index is 32.39 kg/m².    Objective     Physical Exam  Physical Exam  Constitutional:       Appearance: Normal appearance.   HENT:      Nose: Nose normal.   Cardiovascular:      Rate and Rhythm: Normal rate.      Pulses: Normal pulses.   Pulmonary:      Breath sounds: Normal breath sounds.   Abdominal:      General: Abdomen is flat.   Neurological:      Mental Status: She is alert.         Assessment/Plan      This 51-year-old presents at this time for follow-up of depression.  With her last visit she was started on Lexapro 10 mg 1 tab p.o. daily with lorazepam n for anxiety.  Instead she has been taking the Lexapro and lorazepam on a as needed basis.  But she relates that she is sleeping much better and overall is feeling much better.    Patient also has had a history of hyperlipidemia she was intolerant of statins and we placed her on a low-cholesterol diet however she was not fasting for today so we will reevaluate.  2 months ago her HDL was excellent at 66 with an LDL of 137.  Note is also made in the interim of visit she got her Covid vaccine.  Weight is essentially unchanged.        Diagnoses and all orders for this visit:    1. Pure hypercholesterolemia (Primary)  Comments:  Reevaluation underway  Orders:  -     Lipid Panel With / Chol / HDL Ratio    2. Moderate episode of recurrent major depressive disorder (CMS/HCC)  Comments:  Stable      Plan:  1.)  Patient was urged to restart her Lexapro at 10 mg p.o. daily.  She is to take the lorazepam as needed.  2.)  Repeat lipid profile  3.)  Schedule for physical examination.       Rivera Churchill MD  09/13/2021

## 2021-11-02 ENCOUNTER — OFFICE VISIT (OUTPATIENT)
Dept: FAMILY MEDICINE CLINIC | Facility: CLINIC | Age: 51
End: 2021-11-02

## 2021-11-02 VITALS
BODY MASS INDEX: 33.09 KG/M2 | RESPIRATION RATE: 16 BRPM | DIASTOLIC BLOOD PRESSURE: 96 MMHG | WEIGHT: 210.8 LBS | HEIGHT: 67 IN | SYSTOLIC BLOOD PRESSURE: 130 MMHG

## 2021-11-02 DIAGNOSIS — E01.0 THYROMEGALY: Chronic | ICD-10-CM

## 2021-11-02 DIAGNOSIS — I10 PRIMARY HYPERTENSION: ICD-10-CM

## 2021-11-02 DIAGNOSIS — Z00.00 ANNUAL PHYSICAL EXAM: Primary | ICD-10-CM

## 2021-11-02 PROCEDURE — 99396 PREV VISIT EST AGE 40-64: CPT | Performed by: INTERNAL MEDICINE

## 2021-11-02 RX ORDER — LISINOPRIL 10 MG/1
10 TABLET ORAL DAILY
Qty: 30 TABLET | Refills: 2 | Status: SHIPPED | OUTPATIENT
Start: 2021-11-02

## 2021-11-02 NOTE — PROGRESS NOTES
2021    CC: Annual Exam (...no other issues)  .        HPI  History of Present Illness     Subjective   Franchesca Elizondo is a 51 y.o. female.      The following portions of the patient's history were reviewed and updated as appropriate: allergies, current medications, past family history, past medical history, past social history, past surgical history and problem list.    Problem List  There is no problem list on file for this patient.      Past Medical History  Past Medical History:   Diagnosis Date   • Abnormal weight gain 2019   • Dizziness and giddiness    • Hyperlipidemia 2018   • Hypertension 2018   • Menorrhagia    • Vitamin D deficiency disease 2014       Surgical History  Past Surgical History:   Procedure Laterality Date   • BLADDER REPAIR     •  SECTION     • HYSTERECTOMY     • TUBAL ABDOMINAL LIGATION         Family History  History reviewed. No pertinent family history.    Social History  Social History    Tobacco Use      Smoking status: Never Smoker      Smokeless tobacco: Never Used       Is the Patient a current tobacco user? No    Allergies  Allergies   Allergen Reactions   • Sulfa Antibiotics Itching     Sulfa based eye medicine irritated eyes severely.       Current Medications    Current Outpatient Medications:   •  cholecalciferol (VITAMIN D3) 25 MCG (1000 UT) tablet, Take 1,000 Units by mouth Daily., Disp: , Rfl:   •  EPINEPHrine (EPIPEN) 0.3 MG/0.3ML solution auto-injector injection, Inject 0.3 mg into the appropriate muscle as directed by prescriber., Disp: , Rfl:   •  escitalopram (Lexapro) 10 MG tablet, 1 tablet by mouth daily, Disp: 30 tablet, Rfl: 3  •  LORazepam (Ativan) 0.5 MG tablet, Take 1 tablet by mouth Every 8 (Eight) Hours As Needed for Anxiety., Disp: 7 tablet, Rfl: 0     Review of System  Review of Systems   Constitutional: Negative.    HENT: Negative.    Eyes: Negative.    Respiratory: Negative.    Cardiovascular: Negative.     Gastrointestinal: Negative.    Endocrine: Negative.    Musculoskeletal: Negative.    Skin: Negative.    Allergic/Immunologic: Negative.    Neurological: Negative.    Hematological: Negative.    Psychiatric/Behavioral: Negative.      I have reviewed and confirmed the accuracy of the ROS as documented by the MA/LPN/RN Rivera Churchill MD    Vitals:    11/02/21 0802   BP: 130/96   Resp: 16     Body mass index is 33.02 kg/m².    Objective     Physical Exam  Physical Exam  Vitals and nursing note reviewed.   Constitutional:       Appearance: She is well-developed.   HENT:      Head: Normocephalic and atraumatic.   Eyes:      Conjunctiva/sclera: Conjunctivae normal.   Cardiovascular:      Rate and Rhythm: Normal rate and regular rhythm.      Heart sounds: Normal heart sounds.   Pulmonary:      Effort: Pulmonary effort is normal.      Breath sounds: Normal breath sounds.   Abdominal:      General: Bowel sounds are normal.      Palpations: Abdomen is soft.   Musculoskeletal:         General: Normal range of motion.      Cervical back: Normal range of motion and neck supple.   Skin:     General: Skin is warm and dry.   Neurological:      Mental Status: She is alert and oriented to person, place, and time.   Psychiatric:         Behavior: Behavior normal.         Assessment/Plan        This 51-year-old presents today for physical examination.  She relates she is feeling well having had no problems in the interim of visits.  She has a long history of thyromegaly for the last T4 and TSH levels have been normal and the gland itself has reduced slightly in size.    Patient has new onset hypertension blood pressure on recheck by me was 146/100 in the left arm sitting position standard cuff.  Her weight is up 4 pounds from her last visit.  We will start her on lisinopril 10 mg 1 tab p.o. daily and see her back again for follow-up in the next several weeks.    Re: Anticipatory guidance: We recommended the patient exercise least  30 minutes 5 days out of 7 days/week.        Diagnoses and all orders for this visit:    1. Annual physical exam (Primary)    2. Primary hypertension  Comments:  New onset    3. Thyromegaly  Comments:  Stable      Plan:  1.)  Lisinopril 10 mg 1 tab p.o. every morning  2.)  Lipid profile, CBC, T4 TSH  3.)  Follow-up in 3 to 4 weeks with blood pressure reevaluation.       Rivera Churchill MD  11/02/2021

## 2021-11-03 LAB
ALBUMIN SERPL-MCNC: 4.1 G/DL (ref 3.8–4.8)
ALBUMIN/GLOB SERPL: 1.4 {RATIO} (ref 1.2–2.2)
ALP SERPL-CCNC: 77 IU/L (ref 44–121)
ALT SERPL-CCNC: 14 IU/L (ref 0–32)
AST SERPL-CCNC: 18 IU/L (ref 0–40)
BASOPHILS # BLD AUTO: 0 X10E3/UL (ref 0–0.2)
BASOPHILS NFR BLD AUTO: 0 %
BILIRUB SERPL-MCNC: 0.2 MG/DL (ref 0–1.2)
BUN SERPL-MCNC: 6 MG/DL (ref 6–24)
BUN/CREAT SERPL: 7 (ref 9–23)
CALCIUM SERPL-MCNC: 9.1 MG/DL (ref 8.7–10.2)
CHLORIDE SERPL-SCNC: 105 MMOL/L (ref 96–106)
CO2 SERPL-SCNC: 22 MMOL/L (ref 20–29)
CREAT SERPL-MCNC: 0.82 MG/DL (ref 0.57–1)
EOSINOPHIL # BLD AUTO: 0 X10E3/UL (ref 0–0.4)
EOSINOPHIL NFR BLD AUTO: 1 %
ERYTHROCYTE [DISTWIDTH] IN BLOOD BY AUTOMATED COUNT: 13.9 % (ref 11.7–15.4)
GLOBULIN SER CALC-MCNC: 3 G/DL (ref 1.5–4.5)
GLUCOSE SERPL-MCNC: 84 MG/DL (ref 65–99)
HCT VFR BLD AUTO: 38.7 % (ref 34–46.6)
HGB BLD-MCNC: 12.8 G/DL (ref 11.1–15.9)
IMM GRANULOCYTES # BLD AUTO: 0 X10E3/UL (ref 0–0.1)
IMM GRANULOCYTES NFR BLD AUTO: 0 %
LYMPHOCYTES # BLD AUTO: 1.9 X10E3/UL (ref 0.7–3.1)
LYMPHOCYTES NFR BLD AUTO: 39 %
MCH RBC QN AUTO: 27.8 PG (ref 26.6–33)
MCHC RBC AUTO-ENTMCNC: 33.1 G/DL (ref 31.5–35.7)
MCV RBC AUTO: 84 FL (ref 79–97)
MONOCYTES # BLD AUTO: 0.3 X10E3/UL (ref 0.1–0.9)
MONOCYTES NFR BLD AUTO: 7 %
NEUTROPHILS # BLD AUTO: 2.5 X10E3/UL (ref 1.4–7)
NEUTROPHILS NFR BLD AUTO: 53 %
PLATELET # BLD AUTO: 314 X10E3/UL (ref 150–450)
POTASSIUM SERPL-SCNC: 4.6 MMOL/L (ref 3.5–5.2)
PROT SERPL-MCNC: 7.1 G/DL (ref 6–8.5)
RBC # BLD AUTO: 4.61 X10E6/UL (ref 3.77–5.28)
SODIUM SERPL-SCNC: 139 MMOL/L (ref 134–144)
T4 FREE SERPL-MCNC: 1.05 NG/DL (ref 0.82–1.77)
TSH SERPL DL<=0.005 MIU/L-ACNC: 2.67 UIU/ML (ref 0.45–4.5)
WBC # BLD AUTO: 4.7 X10E3/UL (ref 3.4–10.8)

## 2022-10-11 ENCOUNTER — APPOINTMENT (OUTPATIENT)
Dept: WOMENS IMAGING | Facility: HOSPITAL | Age: 52
End: 2022-10-11

## 2022-10-11 PROCEDURE — 77063 BREAST TOMOSYNTHESIS BI: CPT | Performed by: RADIOLOGY

## 2022-10-11 PROCEDURE — 77067 SCR MAMMO BI INCL CAD: CPT | Performed by: RADIOLOGY

## 2023-03-09 NOTE — TELEPHONE ENCOUNTER
The PA was approved.  Patient informed she may  today.   Consent 3/Introductory Paragraph: I gave the patient a chance to ask questions they had about the procedure.  Following this I explained the Mohs procedure and consent was obtained. The risks, benefits and alternatives to therapy were discussed in detail. Specifically, the risks of infection, scarring, bleeding, prolonged wound healing, incomplete removal, allergy to anesthesia, nerve injury and recurrence were addressed. Prior to the procedure, the treatment site was clearly identified and confirmed by the patient. All components of Universal Protocol/PAUSE Rule completed.

## 2025-01-08 ENCOUNTER — OFFICE VISIT (OUTPATIENT)
Dept: FAMILY MEDICINE CLINIC | Facility: CLINIC | Age: 55
End: 2025-01-08
Payer: COMMERCIAL

## 2025-01-08 VITALS
BODY MASS INDEX: 37.53 KG/M2 | RESPIRATION RATE: 16 BRPM | OXYGEN SATURATION: 98 % | WEIGHT: 239.1 LBS | HEIGHT: 67 IN | HEART RATE: 86 BPM | SYSTOLIC BLOOD PRESSURE: 148 MMHG | DIASTOLIC BLOOD PRESSURE: 104 MMHG | TEMPERATURE: 98 F

## 2025-01-08 DIAGNOSIS — Z12.11 ENCOUNTER FOR SCREENING FOR MALIGNANT NEOPLASM OF COLON: ICD-10-CM

## 2025-01-08 DIAGNOSIS — Z00.00 ANNUAL PHYSICAL EXAM: Primary | ICD-10-CM

## 2025-01-08 DIAGNOSIS — I10 PRIMARY HYPERTENSION: ICD-10-CM

## 2025-01-08 DIAGNOSIS — E55.9 VITAMIN D DEFICIENCY: ICD-10-CM

## 2025-01-08 DIAGNOSIS — E66.812 CLASS 2 OBESITY DUE TO EXCESS CALORIES WITHOUT SERIOUS COMORBIDITY WITH BODY MASS INDEX (BMI) OF 37.0 TO 37.9 IN ADULT: ICD-10-CM

## 2025-01-08 DIAGNOSIS — E66.09 CLASS 2 OBESITY DUE TO EXCESS CALORIES WITHOUT SERIOUS COMORBIDITY WITH BODY MASS INDEX (BMI) OF 37.0 TO 37.9 IN ADULT: ICD-10-CM

## 2025-01-08 DIAGNOSIS — E78.00 PURE HYPERCHOLESTEROLEMIA: ICD-10-CM

## 2025-01-08 PROCEDURE — 99386 PREV VISIT NEW AGE 40-64: CPT | Performed by: INTERNAL MEDICINE

## 2025-01-08 RX ORDER — ERYTHROMYCIN 5 MG/G
OINTMENT OPHTHALMIC 3 TIMES DAILY
COMMUNITY
Start: 2024-12-30 | End: 2025-01-09

## 2025-01-08 NOTE — ASSESSMENT & PLAN NOTE
Last lipid panel over 3 years ago showed elevated LDL cholesterol.  To repeat fasting lipid panel today

## 2025-01-08 NOTE — ASSESSMENT & PLAN NOTE
Patient's (Body mass index is 37.44 kg/m².) indicates that they are obese (BMI >30) with health conditions that include hypertension and dyslipidemias . Weight is worsening.  BMI increased from 33.0 to 37.4.  BMI  is above average; BMI management plan is completed. We discussed low calorie, low carb based diet program, portion control, increasing exercise, and joining a fitness center or start home based exercise program.

## 2025-01-08 NOTE — PROGRESS NOTES
Subjective   Franchesca Elizondo is a 54 y.o. female who presents for annual female wellness exam.  Chief Complaint   Patient presents with    Establish Care     Pt here to establish care, pt is fasting     Hyperlipidemia   Patient presented to the clinic today to establish care and for annual physical.  She has a history of hypertension, hyperlipidemia and vitamin D deficiency but not currently on any BP medication.  Was previously on lisinopril 10 mg but was discontinued due to low blood pressure.  Patient states she has whitecoat hypertension and that home BP readings are usually within normal range.  She uses a wrist BP monitoring device at home.  She also reports weight gain over the past few years, states weight has been fluctuating up and down & may be related to thyroid issue given family history of thyroid disorder.  She states she tries to maintain a healthy diet but does not exercise much as she used to.  No smoking or alcohol use.     Menstrual History: menopause,  h/o hysterectomy  Sexual History: active   Contraception: none  Diet: tries to maintain a healthy diet  Exercise: does not exercise much as she used to  Feels safe and denies any form of abuse  Last dental exam: Feb 2024  Eye exam: Dec 2024      Mammogram: Up to date  Pap Smear: Up to date  Bone Density: Not due for age  Colon Cancer Screening: due today    There is no immunization history for the selected administration types on file for this patient.    The following portions of the patient's history were reviewed and updated as appropriate: allergies, current medications, past family history, past medical history, past social history, past surgical history and problem list.    Past Medical History:   Diagnosis Date    Abnormal weight gain 07/18/2019    Annual physical exam 1/8/2025    Dizziness and giddiness     Hyperlipidemia 06/08/2018    Hypertension 05/18/2018    Menorrhagia     Vitamin D deficiency disease 06/06/2014       Past  Surgical History:   Procedure Laterality Date    BLADDER REPAIR       SECTION      HYSTERECTOMY      TUBAL ABDOMINAL LIGATION         Family History   Problem Relation Age of Onset    Hypertension Mother     Hyperlipidemia Mother     Thyroid disease Father     Heart disease Maternal Grandmother     Dementia Paternal Grandmother     Dementia Paternal Grandfather        Social History     Socioeconomic History    Marital status:    Tobacco Use    Smoking status: Never     Passive exposure: Never    Smokeless tobacco: Never   Vaping Use    Vaping status: Never Used   Substance and Sexual Activity    Alcohol use: No    Drug use: No    Sexual activity: Yes     Partners: Male     Comment: hyst         Objective   Vitals:    25 0836   BP: (!) 148/104   Pulse: 86   Resp: 16   Temp: 98 °F (36.7 °C)   SpO2: 98%     Body mass index is 37.44 kg/m².  Physical Exam               Assessment & Plan   Diagnoses and all orders for this visit:    1. Annual physical exam (Primary)  Assessment & Plan:  Up-to-date with screening mammogram and Pap smear.  Due for screening colonoscopy.  Referred to GI  Discussed the importance of maintaining a healthy weight and getting regular exercise.  Educated patient on the benefits of healthy diet.  Advise follow-up annually for wellness exams.    Orders:  -     CBC Auto Differential  -     Comprehensive Metabolic Panel  -     Lipid Panel With / Chol / HDL Ratio  -     TSH+Free T4  -     Urinalysis With Microscopic - Urine, Clean Catch  -     Hemoglobin A1c    2. Encounter for screening for malignant neoplasm of colon  -     Ambulatory Referral For Screening Colonoscopy    3. Pure hypercholesterolemia  Assessment & Plan:  Last lipid panel over 3 years ago showed elevated LDL cholesterol.  To repeat fasting lipid panel today     Orders:  -     Lipid Panel With / Chol / HDL Ratio    4. Primary hypertension  Assessment & Plan:  Patient has new onset Hypertension  Ambulatory BP  monitoring  Dietary sodium restriction.  Recommended strategies for weight loss.  Counseled on importance of healthy eating and regular aerobic exercise  Advised to check BP regularly and call office if frequently >140/90  Blood pressure will be reassessed in 2 weeks.      5. Class 2 obesity due to excess calories without serious comorbidity with body mass index (BMI) of 37.0 to 37.9 in adult  Assessment & Plan:  Patient's (Body mass index is 37.44 kg/m².) indicates that they are obese (BMI >30) with health conditions that include hypertension and dyslipidemias . Weight is worsening.  BMI increased from 33.0 to 37.4.  BMI  is above average; BMI management plan is completed. We discussed low calorie, low carb based diet program, portion control, increasing exercise, and joining a fitness center or start home based exercise program.       6. Vitamin D deficiency  Assessment & Plan:  Patient currently on OTC vitamin D supplementation.  To repeat vitamin D level today    Orders:  -     Vitamin D,25-Hydroxy                      Return in about 2 weeks (around 1/22/2025) for BP recheck.

## 2025-01-08 NOTE — ASSESSMENT & PLAN NOTE
Up-to-date with screening mammogram and Pap smear.  Due for screening colonoscopy.  Referred to GI  Discussed the importance of maintaining a healthy weight and getting regular exercise.  Educated patient on the benefits of healthy diet.  Advise follow-up annually for wellness exams.

## 2025-01-08 NOTE — ASSESSMENT & PLAN NOTE
Patient has new onset Hypertension  Ambulatory BP monitoring  Dietary sodium restriction.  Recommended strategies for weight loss.  Counseled on importance of healthy eating and regular aerobic exercise  Advised to check BP regularly and call office if frequently >140/90  Blood pressure will be reassessed in 2 weeks.

## 2025-01-09 ENCOUNTER — PATIENT ROUNDING (BHMG ONLY) (OUTPATIENT)
Dept: FAMILY MEDICINE CLINIC | Facility: CLINIC | Age: 55
End: 2025-01-09
Payer: COMMERCIAL

## 2025-01-09 LAB
25(OH)D3+25(OH)D2 SERPL-MCNC: 31.1 NG/ML (ref 30–100)
ALBUMIN SERPL-MCNC: 4.2 G/DL (ref 3.5–5.2)
ALBUMIN/GLOB SERPL: 1.2 G/DL
ALP SERPL-CCNC: 87 U/L (ref 39–117)
ALT SERPL-CCNC: 16 U/L (ref 1–33)
APPEARANCE UR: CLEAR
AST SERPL-CCNC: 18 U/L (ref 1–32)
BACTERIA #/AREA URNS HPF: ABNORMAL /HPF
BASOPHILS # BLD AUTO: 0.01 10*3/MM3 (ref 0–0.2)
BASOPHILS NFR BLD AUTO: 0.2 % (ref 0–1.5)
BILIRUB SERPL-MCNC: 0.4 MG/DL (ref 0–1.2)
BILIRUB UR QL STRIP: NEGATIVE
BUN SERPL-MCNC: 9 MG/DL (ref 6–20)
BUN/CREAT SERPL: 9.9 (ref 7–25)
CALCIUM SERPL-MCNC: 10.2 MG/DL (ref 8.6–10.5)
CASTS URNS MICRO: ABNORMAL
CHLORIDE SERPL-SCNC: 105 MMOL/L (ref 98–107)
CHOLEST SERPL-MCNC: 238 MG/DL (ref 0–200)
CHOLEST/HDLC SERPL: 3.61 {RATIO}
CO2 SERPL-SCNC: 25.5 MMOL/L (ref 22–29)
COLOR UR: YELLOW
CREAT SERPL-MCNC: 0.91 MG/DL (ref 0.57–1)
EGFRCR SERPLBLD CKD-EPI 2021: 76.1 ML/MIN/1.73
EOSINOPHIL # BLD AUTO: 0.04 10*3/MM3 (ref 0–0.4)
EOSINOPHIL NFR BLD AUTO: 0.7 % (ref 0.3–6.2)
EPI CELLS #/AREA URNS HPF: ABNORMAL /HPF
ERYTHROCYTE [DISTWIDTH] IN BLOOD BY AUTOMATED COUNT: 14.2 % (ref 12.3–15.4)
GLOBULIN SER CALC-MCNC: 3.6 GM/DL
GLUCOSE SERPL-MCNC: 89 MG/DL (ref 65–99)
GLUCOSE UR QL STRIP: NEGATIVE
HBA1C MFR BLD: 5.4 % (ref 4.8–5.6)
HCT VFR BLD AUTO: 42.7 % (ref 34–46.6)
HDLC SERPL-MCNC: 66 MG/DL (ref 40–60)
HGB BLD-MCNC: 13.8 G/DL (ref 12–15.9)
HGB UR QL STRIP: ABNORMAL
IMM GRANULOCYTES # BLD AUTO: 0.01 10*3/MM3 (ref 0–0.05)
IMM GRANULOCYTES NFR BLD AUTO: 0.2 % (ref 0–0.5)
KETONES UR QL STRIP: NEGATIVE
LDLC SERPL CALC-MCNC: 158 MG/DL (ref 0–100)
LEUKOCYTE ESTERASE UR QL STRIP: NEGATIVE
LYMPHOCYTES # BLD AUTO: 2.13 10*3/MM3 (ref 0.7–3.1)
LYMPHOCYTES NFR BLD AUTO: 34.6 % (ref 19.6–45.3)
MCH RBC QN AUTO: 27.5 PG (ref 26.6–33)
MCHC RBC AUTO-ENTMCNC: 32.3 G/DL (ref 31.5–35.7)
MCV RBC AUTO: 85.2 FL (ref 79–97)
MONOCYTES # BLD AUTO: 0.49 10*3/MM3 (ref 0.1–0.9)
MONOCYTES NFR BLD AUTO: 8 % (ref 5–12)
NEUTROPHILS # BLD AUTO: 3.47 10*3/MM3 (ref 1.7–7)
NEUTROPHILS NFR BLD AUTO: 56.3 % (ref 42.7–76)
NITRITE UR QL STRIP: NEGATIVE
NRBC BLD AUTO-RTO: 0 /100 WBC (ref 0–0.2)
PH UR STRIP: 5.5 [PH] (ref 5–8)
PLATELET # BLD AUTO: 356 10*3/MM3 (ref 140–450)
POTASSIUM SERPL-SCNC: 4.7 MMOL/L (ref 3.5–5.2)
PROT SERPL-MCNC: 7.8 G/DL (ref 6–8.5)
PROT UR QL STRIP: NEGATIVE
RBC # BLD AUTO: 5.01 10*6/MM3 (ref 3.77–5.28)
RBC #/AREA URNS HPF: ABNORMAL /HPF
SODIUM SERPL-SCNC: 139 MMOL/L (ref 136–145)
SP GR UR STRIP: 1.02 (ref 1–1.03)
T4 FREE SERPL-MCNC: 1.35 NG/DL (ref 0.92–1.68)
TRIGL SERPL-MCNC: 83 MG/DL (ref 0–150)
TSH SERPL DL<=0.005 MIU/L-ACNC: 2.54 UIU/ML (ref 0.27–4.2)
UROBILINOGEN UR STRIP-MCNC: ABNORMAL MG/DL
VLDLC SERPL CALC-MCNC: 14 MG/DL (ref 5–40)
WBC # BLD AUTO: 6.15 10*3/MM3 (ref 3.4–10.8)
WBC #/AREA URNS HPF: ABNORMAL /HPF

## 2025-01-23 ENCOUNTER — OFFICE VISIT (OUTPATIENT)
Dept: FAMILY MEDICINE CLINIC | Facility: CLINIC | Age: 55
End: 2025-01-23
Payer: COMMERCIAL

## 2025-01-23 VITALS
WEIGHT: 241.5 LBS | SYSTOLIC BLOOD PRESSURE: 148 MMHG | TEMPERATURE: 97.5 F | RESPIRATION RATE: 17 BRPM | HEIGHT: 67 IN | OXYGEN SATURATION: 100 % | DIASTOLIC BLOOD PRESSURE: 96 MMHG | BODY MASS INDEX: 37.9 KG/M2 | HEART RATE: 75 BPM

## 2025-01-23 DIAGNOSIS — I10 PRIMARY HYPERTENSION: Primary | ICD-10-CM

## 2025-01-23 PROCEDURE — 99213 OFFICE O/P EST LOW 20 MIN: CPT | Performed by: INTERNAL MEDICINE

## 2025-01-23 RX ORDER — AMLODIPINE BESYLATE 10 MG/1
10 TABLET ORAL DAILY
Qty: 30 TABLET | Refills: 0 | Status: SHIPPED | OUTPATIENT
Start: 2025-01-23

## 2025-01-23 RX ORDER — HYDROCHLOROTHIAZIDE 12.5 MG/1
12.5 TABLET ORAL DAILY
Qty: 30 TABLET | Refills: 0 | Status: SHIPPED | OUTPATIENT
Start: 2025-01-23

## 2025-01-23 NOTE — ASSESSMENT & PLAN NOTE
Hypertension is uncontrolled  To start on amlodipine 10 mg daily and HCTZ 12.5 mg daily  Counseled her on the importance of BP monitoring and to keep records for review at next visit  Dietary sodium restriction.  Weight loss.  Regular aerobic exercise.  Ambulatory blood pressure monitoring.  Blood pressure will be reassessedin 4 weeks.

## 2025-01-23 NOTE — PROGRESS NOTES
"  Chief Complaint   Patient presents with    Hypertension     Fup bp     Hyperlipidemia    Primary Care Follow-Up    Results     Labs fup.       History of Present Illness:  Patient is here for follow-up of hypertension.  She reports still having elevated blood pressure above 140/90 at home, averaging in the range of 120s to 140s over 90s.  Blood pressure in the office today elevated above 140/90 mmHg.    PMH:   Outpatient Medications Prior to Visit   Medication Sig Dispense Refill    cholecalciferol (VITAMIN D3) 25 MCG (1000 UT) tablet Take 1 tablet by mouth Daily.      EPINEPHrine (EPIPEN) 0.3 MG/0.3ML solution auto-injector injection Inject 0.3 mL into the appropriate muscle as directed by prescriber.       No facility-administered medications prior to visit.      Allergies   Allergen Reactions    Shellfish-Derived Products Unknown - High Severity    Sulfa Antibiotics Itching and Unknown - High Severity     Sulfa based eye medicine irritated eyes severely.     Past Surgical History:   Procedure Laterality Date    BLADDER REPAIR       SECTION      HYSTERECTOMY      TUBAL ABDOMINAL LIGATION       family history includes Dementia in her paternal grandfather and paternal grandmother; Heart disease in her maternal grandmother; Hyperlipidemia in her mother; Hypertension in her mother; Thyroid disease in her father.   reports that she has never smoked. She has never been exposed to tobacco smoke. She has never used smokeless tobacco. She reports that she does not drink alcohol and does not use drugs.     /96 (BP Location: Right arm, Patient Position: Sitting, Cuff Size: Adult)   Pulse 75   Temp 97.5 °F (36.4 °C) (Temporal)   Resp 17   Ht 170.2 cm (67.01\")   Wt 110 kg (241 lb 8 oz)   LMP  (LMP Unknown)   SpO2 100%   BMI 37.82 kg/m²   Physical Exam  Constitutional:       Appearance: Normal appearance.   HENT:      Head: Normocephalic and atraumatic.   Cardiovascular:      Heart sounds: Normal heart " sounds.   Pulmonary:      Breath sounds: Normal breath sounds.   Neurological:      Mental Status: She is alert and oriented to person, place, and time.                   Diagnoses and all orders for this visit:    1. Primary hypertension (Primary)  Assessment & Plan:  Hypertension is uncontrolled  To start on amlodipine 10 mg daily and HCTZ 12.5 mg daily  Counseled her on the importance of BP monitoring and to keep records for review at next visit  Dietary sodium restriction.  Weight loss.  Regular aerobic exercise.  Ambulatory blood pressure monitoring.  Blood pressure will be reassessedin 4 weeks.    Orders:  -     amLODIPine (NORVASC) 10 MG tablet; Take 1 tablet by mouth Daily.  Dispense: 30 tablet; Refill: 0  -     hydroCHLOROthiazide 12.5 MG tablet; Take 1 tablet by mouth Daily.  Dispense: 30 tablet; Refill: 0             Return in about 4 weeks (around 2/20/2025) for BP recheck.

## 2025-02-14 ENCOUNTER — TELEPHONE (OUTPATIENT)
Dept: GASTROENTEROLOGY | Facility: CLINIC | Age: 55
End: 2025-02-14
Payer: COMMERCIAL

## 2025-02-14 NOTE — TELEPHONE ENCOUNTER
Patient reports last colonoscopy 12 yrs ago with [facility] - no records found    No personal hx polyps    No family hx polyps or cx    ASA or blood thinners:  None    List medications:  Amlodipine  Hydrochlorothiazide    OA form scanned in media

## 2025-02-16 DIAGNOSIS — Z12.11 ENCOUNTER FOR SCREENING FOR MALIGNANT NEOPLASM OF COLON: Primary | ICD-10-CM

## 2025-02-16 RX ORDER — SODIUM CHLORIDE, SODIUM LACTATE, POTASSIUM CHLORIDE, CALCIUM CHLORIDE 600; 310; 30; 20 MG/100ML; MG/100ML; MG/100ML; MG/100ML
30 INJECTION, SOLUTION INTRAVENOUS CONTINUOUS
OUTPATIENT
Start: 2025-02-16 | End: 2025-02-16

## 2025-02-17 ENCOUNTER — TELEPHONE (OUTPATIENT)
Dept: GASTROENTEROLOGY | Facility: CLINIC | Age: 55
End: 2025-02-17
Payer: COMMERCIAL

## 2025-02-17 NOTE — TELEPHONE ENCOUNTER
Email sent to EP  for COLONOSCOPY on 3/26/25  arrive at 7:00  . Sent prep instructions to pt my chart....miralax

## 2025-02-20 DIAGNOSIS — I10 PRIMARY HYPERTENSION: ICD-10-CM

## 2025-02-24 RX ORDER — HYDROCHLOROTHIAZIDE 12.5 MG/1
12.5 TABLET ORAL DAILY
Qty: 30 TABLET | Refills: 0 | Status: SHIPPED | OUTPATIENT
Start: 2025-02-24 | End: 2025-02-27 | Stop reason: SDUPTHER

## 2025-02-24 RX ORDER — AMLODIPINE BESYLATE 10 MG/1
10 TABLET ORAL DAILY
Qty: 30 TABLET | Refills: 0 | Status: SHIPPED | OUTPATIENT
Start: 2025-02-24 | End: 2025-02-27 | Stop reason: SDUPTHER

## 2025-02-27 ENCOUNTER — OFFICE VISIT (OUTPATIENT)
Dept: FAMILY MEDICINE CLINIC | Facility: CLINIC | Age: 55
End: 2025-02-27
Payer: COMMERCIAL

## 2025-02-27 VITALS
TEMPERATURE: 97.3 F | DIASTOLIC BLOOD PRESSURE: 96 MMHG | WEIGHT: 238.9 LBS | BODY MASS INDEX: 37.49 KG/M2 | OXYGEN SATURATION: 100 % | HEIGHT: 67 IN | SYSTOLIC BLOOD PRESSURE: 142 MMHG | RESPIRATION RATE: 18 BRPM | HEART RATE: 84 BPM

## 2025-02-27 DIAGNOSIS — I10 PRIMARY HYPERTENSION: Primary | ICD-10-CM

## 2025-02-27 DIAGNOSIS — E78.00 PURE HYPERCHOLESTEROLEMIA: ICD-10-CM

## 2025-02-27 PROCEDURE — 99213 OFFICE O/P EST LOW 20 MIN: CPT | Performed by: INTERNAL MEDICINE

## 2025-02-27 RX ORDER — HYDROCHLOROTHIAZIDE 12.5 MG/1
12.5 TABLET ORAL DAILY
Qty: 90 TABLET | Refills: 3 | Status: SHIPPED | OUTPATIENT
Start: 2025-02-27

## 2025-02-27 RX ORDER — AMLODIPINE BESYLATE 10 MG/1
10 TABLET ORAL DAILY
Qty: 90 TABLET | Refills: 3 | Status: SHIPPED | OUTPATIENT
Start: 2025-02-27

## 2025-02-27 NOTE — PROGRESS NOTES
Chief Complaint   Patient presents with    Hypertension    Hyperlipidemia    Vitamin D Deficiency    Primary Care Follow-Up     Pt here for blood pressure recheck       History of Present Illness:  Patient is here for follow-up of hypertension.  She is doing well overall, no new complaints today.  Review of home BP readings indicate improvement in blood pressure, BP averaging in the early 120s systolic and 80-85 diastolic.  She has remained compliant on medication and denies any significant medication side effect.    PMH:   Outpatient Medications Prior to Visit   Medication Sig Dispense Refill    cholecalciferol (VITAMIN D3) 25 MCG (1000 UT) tablet Take 1 tablet by mouth Daily.      EPINEPHrine (EPIPEN) 0.3 MG/0.3ML solution auto-injector injection Inject 0.3 mL into the appropriate muscle as directed by prescriber.      amLODIPine (NORVASC) 10 MG tablet TAKE 1 TABLET BY MOUTH DAILY 30 tablet 0    hydroCHLOROthiazide 12.5 MG tablet TAKE 1 TABLET BY MOUTH DAILY 30 tablet 0     No facility-administered medications prior to visit.      Allergies   Allergen Reactions    Bee Venom Unknown - High Severity    Shellfish-Derived Products Unknown - High Severity    Sulfa Antibiotics Itching and Unknown - High Severity     Sulfa based eye medicine irritated eyes severely.     Past Surgical History:   Procedure Laterality Date    BLADDER REPAIR       SECTION      HYSTERECTOMY      TUBAL ABDOMINAL LIGATION       family history includes Dementia in her paternal grandfather and paternal grandmother; Heart disease in her maternal grandmother; Hyperlipidemia in her mother; Hypertension in her mother; Thyroid disease in her father.   reports that she has never smoked. She has never been exposed to tobacco smoke. She has never used smokeless tobacco. She reports that she does not drink alcohol and does not use drugs.     /96 (BP Location: Left arm, Patient Position: Sitting, Cuff Size: Adult)   Pulse 84   Temp 97.3  "°F (36.3 °C) (Temporal)   Resp 18   Ht 170.2 cm (67.01\")   Wt 108 kg (238 lb 14.4 oz)   LMP  (LMP Unknown)   SpO2 100%   BMI 37.41 kg/m²   Physical Exam  Constitutional:       Appearance: Normal appearance.   HENT:      Head: Normocephalic and atraumatic.   Cardiovascular:      Heart sounds: Normal heart sounds.   Pulmonary:      Breath sounds: Normal breath sounds.   Neurological:      Mental Status: She is alert and oriented to person, place, and time.                   Diagnoses and all orders for this visit:    1. Primary hypertension (Primary)  Assessment & Plan:  Hypertension is improving, stable and controlled  Continue current treatment regimen.  Dietary sodium restriction.  Weight loss.  Regular aerobic exercise.  Ambulatory blood pressure monitoring.  Blood pressure will be reassessed in 6 months.    Orders:  -     hydroCHLOROthiazide 12.5 MG tablet; Take 1 tablet by mouth Daily.  Dispense: 90 tablet; Refill: 3  -     amLODIPine (NORVASC) 10 MG tablet; Take 1 tablet by mouth Daily.  Dispense: 90 tablet; Refill: 3    2. Pure hypercholesterolemia  -     Lipid Panel With / Chol / HDL Ratio; Future             Return in about 6 months (around 8/27/2025) for Follow up with fasting labs.     "

## 2025-02-27 NOTE — ASSESSMENT & PLAN NOTE
Hypertension is improving, stable and controlled  Continue current treatment regimen.  Dietary sodium restriction.  Weight loss.  Regular aerobic exercise.  Ambulatory blood pressure monitoring.  Blood pressure will be reassessed in 6 months.

## 2025-03-24 DIAGNOSIS — I10 PRIMARY HYPERTENSION: ICD-10-CM

## 2025-03-24 RX ORDER — HYDROCHLOROTHIAZIDE 12.5 MG/1
12.5 TABLET ORAL DAILY
Qty: 30 TABLET | Refills: 3 | Status: SHIPPED | OUTPATIENT
Start: 2025-03-24

## 2025-03-24 RX ORDER — AMLODIPINE BESYLATE 10 MG/1
10 TABLET ORAL DAILY
Qty: 30 TABLET | Refills: 3 | Status: SHIPPED | OUTPATIENT
Start: 2025-03-24

## 2025-03-26 ENCOUNTER — HOSPITAL ENCOUNTER (OUTPATIENT)
Facility: SURGERY CENTER | Age: 55
Setting detail: HOSPITAL OUTPATIENT SURGERY
Discharge: HOME | End: 2025-03-26
Attending: INTERNAL MEDICINE | Admitting: INTERNAL MEDICINE
Payer: COMMERCIAL

## 2025-03-26 ENCOUNTER — ANESTHESIA (OUTPATIENT)
Dept: SURGERY | Facility: SURGERY CENTER | Age: 55
End: 2025-03-26
Payer: COMMERCIAL

## 2025-03-26 ENCOUNTER — ANESTHESIA EVENT (OUTPATIENT)
Dept: SURGERY | Facility: SURGERY CENTER | Age: 55
End: 2025-03-26
Payer: COMMERCIAL

## 2025-03-26 VITALS
WEIGHT: 233.6 LBS | HEART RATE: 78 BPM | RESPIRATION RATE: 16 BRPM | DIASTOLIC BLOOD PRESSURE: 91 MMHG | HEIGHT: 67 IN | SYSTOLIC BLOOD PRESSURE: 144 MMHG | TEMPERATURE: 98.4 F | BODY MASS INDEX: 36.66 KG/M2 | OXYGEN SATURATION: 97 %

## 2025-03-26 DIAGNOSIS — Z12.11 ENCOUNTER FOR SCREENING FOR MALIGNANT NEOPLASM OF COLON: ICD-10-CM

## 2025-03-26 PROCEDURE — 88342 IMHCHEM/IMCYTCHM 1ST ANTB: CPT | Performed by: INTERNAL MEDICINE

## 2025-03-26 PROCEDURE — 45380 COLONOSCOPY AND BIOPSY: CPT | Performed by: INTERNAL MEDICINE

## 2025-03-26 PROCEDURE — 88341 IMHCHEM/IMCYTCHM EA ADD ANTB: CPT | Performed by: INTERNAL MEDICINE

## 2025-03-26 PROCEDURE — 45385 COLONOSCOPY W/LESION REMOVAL: CPT | Performed by: INTERNAL MEDICINE

## 2025-03-26 PROCEDURE — 25010000002 LIDOCAINE 2% SOLUTION: Performed by: REGISTERED NURSE

## 2025-03-26 PROCEDURE — 88305 TISSUE EXAM BY PATHOLOGIST: CPT | Performed by: INTERNAL MEDICINE

## 2025-03-26 PROCEDURE — 25010000002 PROPOFOL 10 MG/ML EMULSION: Performed by: REGISTERED NURSE

## 2025-03-26 PROCEDURE — 25810000003 LACTATED RINGERS PER 1000 ML: Performed by: INTERNAL MEDICINE

## 2025-03-26 RX ORDER — PROPOFOL 10 MG/ML
VIAL (ML) INTRAVENOUS AS NEEDED
Status: DISCONTINUED | OUTPATIENT
Start: 2025-03-26 | End: 2025-03-26 | Stop reason: SURG

## 2025-03-26 RX ORDER — SODIUM CHLORIDE, SODIUM LACTATE, POTASSIUM CHLORIDE, CALCIUM CHLORIDE 600; 310; 30; 20 MG/100ML; MG/100ML; MG/100ML; MG/100ML
30 INJECTION, SOLUTION INTRAVENOUS CONTINUOUS
Status: DISCONTINUED | OUTPATIENT
Start: 2025-03-26 | End: 2025-03-26 | Stop reason: HOSPADM

## 2025-03-26 RX ORDER — LIDOCAINE HYDROCHLORIDE 20 MG/ML
INJECTION, SOLUTION INFILTRATION; PERINEURAL AS NEEDED
Status: DISCONTINUED | OUTPATIENT
Start: 2025-03-26 | End: 2025-03-26 | Stop reason: SURG

## 2025-03-26 RX ORDER — ONDANSETRON 2 MG/ML
4 INJECTION INTRAMUSCULAR; INTRAVENOUS ONCE AS NEEDED
Status: DISCONTINUED | OUTPATIENT
Start: 2025-03-26 | End: 2025-03-26 | Stop reason: HOSPADM

## 2025-03-26 RX ADMIN — PROPOFOL 50 MG: 10 INJECTION, EMULSION INTRAVENOUS at 07:59

## 2025-03-26 RX ADMIN — SODIUM CHLORIDE, POTASSIUM CHLORIDE, SODIUM LACTATE AND CALCIUM CHLORIDE 30 ML/HR: 600; 310; 30; 20 INJECTION, SOLUTION INTRAVENOUS at 07:38

## 2025-03-26 RX ADMIN — LIDOCAINE HYDROCHLORIDE 50 MG: 20 INJECTION, SOLUTION INFILTRATION; PERINEURAL at 07:58

## 2025-03-26 RX ADMIN — PROPOFOL 100 MG: 10 INJECTION, EMULSION INTRAVENOUS at 07:58

## 2025-03-26 RX ADMIN — PROPOFOL 160 MCG/KG/MIN: 10 INJECTION, EMULSION INTRAVENOUS at 07:59

## 2025-03-26 NOTE — ANESTHESIA POSTPROCEDURE EVALUATION
Patient: Franchesca Elizondo    Procedure Summary       Date: 03/26/25 Room / Location: [facility] / [facility]    Anesthesia Start: 0754 Anesthesia Stop: 0824    Procedure: COLONOSCOPY Diagnosis:       Encounter for screening for malignant neoplasm of colon      (Encounter for screening for malignant neoplasm of colon [Z12.11])    Surgeons: Linden Murrell MD Provider: Richard Dawson MD    Anesthesia Type: MAC ASA Status: 2            Anesthesia Type: MAC    Vitals  Vitals Value Taken Time   /91 03/26/25 08:41   Temp 36.9 °C (98.4 °F) 03/26/25 08:22   Pulse 79 03/26/25 08:37   Resp 16 03/26/25 08:35   SpO2 97 % 03/26/25 08:37   Vitals shown include unfiled device data.        Anesthesia Post Evaluation

## 2025-03-26 NOTE — ANESTHESIA PREPROCEDURE EVALUATION
Anesthesia Evaluation     Patient summary reviewed and Nursing notes reviewed   no history of anesthetic complications:   NPO Solid Status: > 8 hours  NPO Liquid Status: > 2 hours           Airway   Dental      Pulmonary    Cardiovascular     (+) hypertension      Neuro/Psych  GI/Hepatic/Renal/Endo    (+) obesity    Musculoskeletal     Abdominal   (+) obese   Substance History      OB/GYN          Other                          Anesthesia Plan    ASA 2     MAC     intravenous induction     Anesthetic plan, risks, benefits, and alternatives have been provided, discussed and informed consent has been obtained with: patient.        CODE STATUS:

## 2025-03-26 NOTE — H&P
"[facility]  Pre Procedure History & Physical    Chief Complaint:   Time for my colonoscopy    Subjective     HPI:   55 y.o. female presenting to endoscopy unit today for screening colonoscopy.    Past Medical History:   Past Medical History:   Diagnosis Date    Abnormal weight gain 07/18/2019    Annual physical exam 01/08/2025    Dizziness and giddiness     Hyperlipidemia 06/08/2018    Hypertension 05/18/2018    Menorrhagia     Vitamin D deficiency disease 06/06/2014       Family History:  Family History   Problem Relation Age of Onset    Hypertension Mother     Hyperlipidemia Mother     Thyroid disease Father     Heart disease Maternal Grandmother     Dementia Paternal Grandmother     Dementia Paternal Grandfather        Social History:   reports that she has never smoked. She has never been exposed to tobacco smoke. She has never used smokeless tobacco. She reports that she does not drink alcohol and does not use drugs.    Medications:   Medications Prior to Admission   Medication Sig Dispense Refill Last Dose/Taking    amLODIPine (NORVASC) 10 MG tablet TAKE 1 TABLET BY MOUTH DAILY 30 tablet 3 3/25/2025    cholecalciferol (VITAMIN D3) 25 MCG (1000 UT) tablet Take 1 tablet by mouth Daily.   Past Week    hydroCHLOROthiazide 12.5 MG tablet TAKE 1 TABLET BY MOUTH DAILY 30 tablet 3 3/25/2025    EPINEPHrine (EPIPEN) 0.3 MG/0.3ML solution auto-injector injection Inject 0.3 mL into the appropriate muscle as directed by prescriber.   Unknown       Allergies:  Bee venom, Shellfish-derived products, and Sulfa antibiotics      Objective     Blood pressure 151/98, pulse 104, temperature 97 °F (36.1 °C), temperature source Temporal, resp. rate 16, height 170.2 cm (67\"), weight 106 kg (233 lb 9.6 oz), SpO2 99%, not currently breastfeeding.  Physical Exam:   General: patient awake, alert and cooperative    Assessment & Plan     Diagnosis:  Encounter for screening for colon cancer    Anticipated Surgical " Procedure:  Colonoscopy    The risks, benefits, and alternatives of this procedure have been discussed with the patient or the responsible party- the patient understands and agrees to proceed.

## 2025-03-28 LAB
CYTO UR: NORMAL
LAB AP CASE REPORT: NORMAL
LAB AP SPECIAL STAINS: NORMAL
PATH REPORT.FINAL DX SPEC: NORMAL
PATH REPORT.GROSS SPEC: NORMAL

## 2025-06-06 ENCOUNTER — HOSPITAL ENCOUNTER (OUTPATIENT)
Facility: HOSPITAL | Age: 55
Discharge: HOME | End: 2025-06-06
Admitting: INTERNAL MEDICINE
Payer: COMMERCIAL

## 2025-06-06 DIAGNOSIS — K63.9 NODULE OF COLON: ICD-10-CM

## 2025-06-06 PROCEDURE — 25510000002 DIATRIZOATE MEGLUMINE & SODIUM PER 1 ML: Performed by: INTERNAL MEDICINE

## 2025-06-06 PROCEDURE — 74177 CT ABD & PELVIS W/CONTRAST: CPT

## 2025-06-06 PROCEDURE — 25510000001 IOPAMIDOL 61 % SOLUTION: Performed by: INTERNAL MEDICINE

## 2025-06-06 RX ORDER — DIATRIZOATE MEGLUMINE AND DIATRIZOATE SODIUM 660; 100 MG/ML; MG/ML
30 SOLUTION ORAL; RECTAL
Status: COMPLETED | OUTPATIENT
Start: 2025-06-06 | End: 2025-06-06

## 2025-06-06 RX ORDER — IOPAMIDOL 612 MG/ML
100 INJECTION, SOLUTION INTRAVASCULAR
Status: COMPLETED | OUTPATIENT
Start: 2025-06-06 | End: 2025-06-06

## 2025-06-06 RX ADMIN — DIATRIZOATE MEGLUMINE AND DIATRIZOATE SODIUM 30 ML: 600; 100 SOLUTION ORAL; RECTAL at 09:55

## 2025-06-06 RX ADMIN — IOPAMIDOL 85 ML: 612 INJECTION, SOLUTION INTRAVENOUS at 11:11

## (undated) DEVICE — FLEX ADVANTAGE 1500CC: Brand: FLEX ADVANTAGE

## (undated) DEVICE — THE SINGLE USE ETRAP – POLYP TRAP IS USED FOR SUCTION RETRIEVAL OF ENDOSCOPICALLY REMOVED POLYPS.: Brand: ETRAP

## (undated) DEVICE — GAUZE,SPONGE,FLUFF,6"X6.75",STRL,5/TRAY: Brand: MEDLINE

## (undated) DEVICE — SNAR POLYP CAPTIVATOR/COLD STFF RND 10MM 240CM

## (undated) DEVICE — KT ORCA ORCAPOD DISP STRL

## (undated) DEVICE — SINGLE-USE BIOPSY FORCEPS: Brand: RADIAL JAW 4

## (undated) DEVICE — CANN O2 ETCO2 FITS ALL CONN CO2 SMPL A/ 7IN DISP LF

## (undated) DEVICE — SYRINGE, LUER SLIP, STERILE, 60ML: Brand: MEDLINE

## (undated) DEVICE — ADAPT CLN SCPE ENDO PORPOISE BX/50 DISP

## (undated) DEVICE — GOWN ISOL W/THUMB UNIV BLU BX/15

## (undated) DEVICE — GOWN SURG ENDOARMOR LVL3 UNIV KNT/CUF DISP NS

## (undated) DEVICE — Device